# Patient Record
Sex: MALE | Race: WHITE | NOT HISPANIC OR LATINO | Employment: FULL TIME | URBAN - METROPOLITAN AREA
[De-identification: names, ages, dates, MRNs, and addresses within clinical notes are randomized per-mention and may not be internally consistent; named-entity substitution may affect disease eponyms.]

---

## 2017-05-26 ENCOUNTER — OFFICE VISIT (OUTPATIENT)
Dept: URGENT CARE | Facility: CLINIC | Age: 27
End: 2017-05-26
Payer: COMMERCIAL

## 2017-05-26 VITALS
RESPIRATION RATE: 16 BRPM | TEMPERATURE: 98.4 F | HEART RATE: 62 BPM | SYSTOLIC BLOOD PRESSURE: 120 MMHG | WEIGHT: 200 LBS | DIASTOLIC BLOOD PRESSURE: 72 MMHG | BODY MASS INDEX: 27.12 KG/M2 | OXYGEN SATURATION: 98 %

## 2017-05-26 DIAGNOSIS — J02.9 PHARYNGITIS, UNSPECIFIED ETIOLOGY: ICD-10-CM

## 2017-05-26 PROCEDURE — 99203 OFFICE O/P NEW LOW 30 MIN: CPT | Performed by: NURSE PRACTITIONER

## 2017-05-26 RX ORDER — AMOXICILLIN 875 MG/1
875 TABLET, COATED ORAL 2 TIMES DAILY
Qty: 20 TAB | Refills: 0 | Status: SHIPPED | OUTPATIENT
Start: 2017-05-26 | End: 2017-06-05

## 2017-05-26 ASSESSMENT — ENCOUNTER SYMPTOMS
MYALGIAS: 1
SWOLLEN GLANDS: 1
CARDIOVASCULAR NEGATIVE: 1
COUGH: 0
EYES NEGATIVE: 1
CHILLS: 1
GASTROINTESTINAL NEGATIVE: 1
HEADACHES: 0
VOMITING: 0
FEVER: 1
TROUBLE SWALLOWING: 1
SORE THROAT: 1
SHORTNESS OF BREATH: 0

## 2017-05-26 NOTE — MR AVS SNAPSHOT
Abdelrahman Bar   2017 10:30 AM   Office Visit   MRN: [id number]    Department:  [facility]   Dept Phone:  [phone]    Description:  Male : 1990   Provider:  Cathey J Hamman, A.P.N.           Reason for Visit     Pharyngitis x1 day body aches      Allergies as of 2017     No Known Allergies      You were diagnosed with     Pharyngitis, unspecified etiology   [[id number]]         Vital Signs     Blood Pressure Pulse Temperature Respirations Weight Oxygen Saturation    120/72 mmHg 62 36.9 °C (98.4 °F) 16 90.719 kg (200 lb) 98%      Basic Information     Date Of Birth Sex Race Ethnicity Preferred Language    1990 Male White Non- English      Health Maintenance        Date Due Completion Dates    IMM DTaP/Tdap/Td Vaccine (1 - Tdap) 2007 ---            Current Immunizations     No immunizations on file.      Below and/or attached are the medications your provider expects you to take. Review all of your home medications and newly ordered medications with your provider and/or pharmacist. Follow medication instructions as directed by your provider and/or pharmacist. Please keep your medication list with you and share with your provider. Update the information when medications are discontinued, doses are changed, or new medications (including over-the-counter products) are added; and carry medication information at all times in the event of emergency situations     Allergies:  No Known Allergies          Medications  Valid as of: May 26, 2017 - 10:47 AM    Generic Name Brand Name Tablet Size Instructions for use    Amoxicillin (Tab) AMOXIL 875 MG Take 1 Tab by mouth 2 times a day for 10 days.        Ibuprofen (Tab) MOTRIN 200 MG as needed         .                 Medicines prescribed today were sent to:     [facility] #[id number] - [place] - [address]    [address] [place] [address]    Phone: [phone] Fax: [phone]    Open 24 Hours?: No      Medication  refill instructions:       If your prescription bottle indicates you have medication refills left, it is not necessary to call your provider’s office. Please contact your pharmacy and they will refill your medication.    If your prescription bottle indicates you do not have any refills left, you may request refills at any time through one of the following ways: The online [employer] system (except Urgent Care), by calling your provider’s office, or by asking your pharmacy to contact your provider’s office with a refill request. Medication refills are processed only during regular business hours and may not be available until the next business day. Your provider may request additional information or to have a follow-up visit with you prior to refilling your medication.   *Please Note: Medication refills are assigned a new Rx number when refilled electronically. Your pharmacy may indicate that no refills were authorized even though a new prescription for the same medication is available at the pharmacy. Please request the medicine by name with the pharmacy before contacting your provider for a refill.           [employer] Access Code: [email or web address]  Expires: 6/25/2017 10:16 AM    Your email address is not on file at [employer].  Email Addresses are required for you to sign up for [employer], please contact [phone] to verify your personal information and to provide your email address prior to attempting to register for [employer].    Abdelrahman Bar  [address] [place] [address]    [employer]  A secure, online tool to manage your health information     [employer]’s [employer]® is a secure, online tool that connects you to your personalized health information from the privacy of your home -- day or night - making it very easy for you to manage your healthcare. Once the activation process is completed, you can even access your medical information using the [employer] dandre, which is available for free in the Apple Dandre  store or Google Play store.     To learn more about [employer], visit [email or web address]    There are two levels of access available (as shown below):   My Chart Features  [facility]  Specialists [facility]  Urgent  Care Non-[facility] Primary Care Doctor   Email your healthcare team securely and privately 24/7 X X X    Manage appointments: schedule your next appointment; view details of past/upcoming appointments X      Request prescription refills. X      View recent personal medical records, including lab and immunizations X X X X   View health record, including health history, allergies, medications X X X X   Read reports about your outpatient visits, procedures, consult and ER notes X X X X   See your discharge summary, which is a recap of your hospital and/or ER visit that includes your diagnosis, lab results, and care plan X X  X     How to register for [employer]:  Once your e-mail address has been verified, follow the following steps to sign up for [employer].     1. Go to  [email or web address]  2. Click on the Sign Up Now box, which takes you to the New Member Sign Up page. You will need to provide the following information:  a. Enter your [employer] Access Code exactly as it appears at the top of this page. (You will not need to use this code after you’ve completed the sign-up process. If you do not sign up before the expiration date, you must request a new code.)   b. Enter your date of birth.   c. Enter your home email address.   d. Click Submit, and follow the next screen’s instructions.  3. Create a [employer] ID. This will be your [employer] login ID and cannot be changed, so think of one that is secure and easy to remember.  4. Create a [employer] password. You can change your password at any time.  5. Enter your Password Reset Question and Answer. This can be used at a later time if you forget your password.   6. Enter your e-mail address. This allows you to receive e-mail notifications when  new information is available in [employer].  7. Click Sign Up. You can now view your health information.    For assistance activating your [employer] account, call [phone]

## 2017-05-26 NOTE — PROGRESS NOTES
Subjective:      Abdelrahman Bar is a 26 y.o. male who presents with No chief complaint on file.    Past Medical History   Diagnosis Date   • Asthma      AS CHILD     Social History     Social History   • Marital Status:      Spouse Name: N/A   • Number of Children: N/A   • Years of Education: N/A     Occupational History   • Not on file.     Social History Main Topics   • Smoking status: Not on file   • Smokeless tobacco: Not on file   • Alcohol Use: Not on file   • Drug Use: Not on file   • Sexual Activity: Not on file     Other Topics Concern   • Not on file     Social History Narrative   • No narrative on file     Family History   Problem Relation Age of Onset   • Cancer Maternal Grandfather      Allergies: Review of patient's allergies indicates no known allergies.    This patient is a 26-year-old male who presents today with complaint of severely sore throat, onset yesterday. He's had body aches and chills with this. He denies any other upper respiratory symptoms or nasal congestion or drainage. He denies cough.        Pharyngitis   This is a new problem. The current episode started yesterday. The problem has been gradually worsening. Neither side of throat is experiencing more pain than the other. Maximum temperature: patient reports tactile fever. The fever has been present for less than 1 day. The pain is moderate. Associated symptoms include swollen glands and trouble swallowing. Pertinent negatives include no congestion, coughing, ear pain, headaches, shortness of breath or vomiting. He has tried nothing for the symptoms. The treatment provided no relief.       Review of Systems   Constitutional: Positive for fever, chills and malaise/fatigue.   HENT: Positive for sore throat and trouble swallowing. Negative for congestion and ear pain.    Eyes: Negative.    Respiratory: Negative for cough and shortness of breath.    Cardiovascular: Negative.    Gastrointestinal: Negative.  Negative for vomiting.    Genitourinary: Negative.    Musculoskeletal: Positive for myalgias.   Skin: Negative.    Neurological: Negative for headaches.       All other systems reviewed and are negative      Objective:     There were no vitals taken for this visit.     Physical Exam   Constitutional: He is oriented to person, place, and time. He appears well-developed and well-nourished. No distress.   HENT:   Head: Normocephalic.   Right Ear: External ear normal.   Left Ear: External ear normal.   Nose: Nose normal.   Mouth/Throat: No oropharyngeal exudate.   Oropharynx is bright red   Eyes: Conjunctivae and EOM are normal. Pupils are equal, round, and reactive to light. Right eye exhibits no discharge. Left eye exhibits no discharge.   Neck: Normal range of motion. Neck supple.   Cardiovascular: Normal rate and regular rhythm.    Pulmonary/Chest: Effort normal and breath sounds normal.   Musculoskeletal: Normal range of motion.   Lymphadenopathy:     He has no cervical adenopathy.   Neurological: He is alert and oriented to person, place, and time.   Skin: Skin is warm and dry. He is not diaphoretic.   Psychiatric: He has a normal mood and affect. His behavior is normal.   Vitals reviewed.              Assessment/Plan:   Pharyngitis  Malaise   -amoxil   -warm saltwater gargles PRN   -chloraseptic spray/lozenges PRN   -follow up if symptoms persist or worsen  There are no diagnoses linked to this encounter.

## 2018-09-10 ENCOUNTER — HOSPITAL ENCOUNTER (OUTPATIENT)
Facility: MEDICAL CENTER | Age: 28
End: 2018-09-10
Attending: PHYSICIAN ASSISTANT

## 2018-09-10 ENCOUNTER — OFFICE VISIT (OUTPATIENT)
Dept: URGENT CARE | Facility: CLINIC | Age: 28
End: 2018-09-10

## 2018-09-10 VITALS
WEIGHT: 187.6 LBS | RESPIRATION RATE: 16 BRPM | TEMPERATURE: 98.2 F | OXYGEN SATURATION: 98 % | DIASTOLIC BLOOD PRESSURE: 82 MMHG | BODY MASS INDEX: 25.41 KG/M2 | HEIGHT: 72 IN | SYSTOLIC BLOOD PRESSURE: 114 MMHG | HEART RATE: 52 BPM

## 2018-09-10 DIAGNOSIS — Z11.3 SCREENING FOR STD (SEXUALLY TRANSMITTED DISEASE): ICD-10-CM

## 2018-09-10 PROCEDURE — 87491 CHLMYD TRACH DNA AMP PROBE: CPT

## 2018-09-10 PROCEDURE — 99212 OFFICE O/P EST SF 10 MIN: CPT | Performed by: PHYSICIAN ASSISTANT

## 2018-09-10 PROCEDURE — 87591 N.GONORRHOEAE DNA AMP PROB: CPT

## 2018-09-10 PROCEDURE — 99000 SPECIMEN HANDLING OFFICE-LAB: CPT | Performed by: PHYSICIAN ASSISTANT

## 2018-09-11 ENCOUNTER — HOSPITAL ENCOUNTER (OUTPATIENT)
Dept: LAB | Facility: MEDICAL CENTER | Age: 28
End: 2018-09-11
Attending: PHYSICIAN ASSISTANT

## 2018-09-11 DIAGNOSIS — Z11.3 SCREENING FOR STD (SEXUALLY TRANSMITTED DISEASE): ICD-10-CM

## 2018-09-11 LAB
HAV IGM SERPL QL IA: NEGATIVE
HBV CORE IGM SER QL: NEGATIVE
HBV SURFACE AG SER QL: NEGATIVE
HCV AB SER QL: NEGATIVE
HIV 1+2 AB+HIV1 P24 AG SERPL QL IA: NON REACTIVE
TREPONEMA PALLIDUM IGG+IGM AB [PRESENCE] IN SERUM OR PLASMA BY IMMUNOASSAY: NON REACTIVE

## 2018-09-11 PROCEDURE — 80074 ACUTE HEPATITIS PANEL: CPT

## 2018-09-11 PROCEDURE — 86780 TREPONEMA PALLIDUM: CPT

## 2018-09-11 PROCEDURE — 86694 HERPES SIMPLEX NES ANTBDY: CPT

## 2018-09-11 PROCEDURE — 36415 COLL VENOUS BLD VENIPUNCTURE: CPT

## 2018-09-11 PROCEDURE — 87389 HIV-1 AG W/HIV-1&-2 AB AG IA: CPT

## 2018-09-11 NOTE — PROGRESS NOTES
"Subjective:      Abdelrahman Bar is a 28 y.o. male who presents with Sexually Transmitted Diseases (std check)            Patient is a 28-year-old male who presents today wanting to be tested for STDs at this time.  Patient currently denies any new sexual partners, any penile discharge, urinary symptoms or recent exposure to STD that he is aware of.  Patient readily admits that he has not been tested for STDs in the past and is wanting \"everything \"tested.      Other   This is a new problem. The current episode started more than 1 year ago. Pertinent negatives include no abdominal pain, change in bowel habit, chills, congestion, fever, myalgias, neck pain, rash, urinary symptoms, visual change or vomiting. Nothing aggravates the symptoms. He has tried nothing for the symptoms.       Review of Systems   Constitutional: Negative for chills and fever.   HENT: Negative for congestion.    Gastrointestinal: Negative for abdominal pain, change in bowel habit, diarrhea and vomiting.   Genitourinary: Negative for dysuria, flank pain, frequency, hematuria and urgency.   Musculoskeletal: Negative for myalgias and neck pain.   Skin: Negative for itching and rash.   All other systems reviewed and are negative.         Objective:     /82   Pulse (!) 52   Temp 36.8 °C (98.2 °F)   Resp 16   Ht 1.829 m (6' 0.01\")   Wt 85.1 kg (187 lb 9.6 oz)   SpO2 98%   BMI 25.44 kg/m²    PMH:  has a past medical history of ASTHMA.  MEDS:   Current Outpatient Prescriptions:   •  IBUPROFEN 200 MG PO TABS, as needed , Disp: , Rfl:   ALLERGIES: No Known Allergies  SURGHX:   Past Surgical History:   Procedure Laterality Date   • MASS EXCISION ORTHO  10/7/08    Performed by [facility] at [facility]   • DENTAL EXTRACTION(S)  2/2007    wisdom teeth     SOCHX:  reports that he has never smoked. He has never used smokeless tobacco.  FH: Family history was reviewed, no pertinent findings to report    Physical Exam "   Constitutional: He is oriented to person, place, and time. He appears well-developed and well-nourished. No distress.   HENT:   Head: Normocephalic and atraumatic.   Eyes: Pupils are equal, round, and reactive to light. Conjunctivae and EOM are normal.   Neck: Normal range of motion. Neck supple. No tracheal deviation present.   Cardiovascular: Normal rate.    Pulmonary/Chest: Effort normal.   Neurological: He is alert and oriented to person, place, and time. Coordination normal.   Skin: Skin is warm. No rash noted.   Psychiatric: He has a normal mood and affect. His behavior is normal. Judgment and thought content normal.   Vitals reviewed.              Assessment/Plan:     1. Screening for STD (sexually transmitted disease)  - HIV AG/AB COMBO ASSAY SCREENING; Future  - RPR (SYPHILIS); Future  - HEPATITIS PANEL ACUTE(4 COMPONENTS); Future  - HSV I/II IGG & IGM SERUM; Future  - CHLAMYDIA/GC PCR URINE OR SWAB; Future     Will send off for blood work and cultures at this time patient currently without symptoms however I will treat if-deemed necessary.  Pt. Understands the plan and agrees.   Also discussed if indeed positive certain positive results would indicate alerting the health department-patient understands.

## 2018-09-12 LAB
C TRACH DNA SPEC QL NAA+PROBE: NEGATIVE
N GONORRHOEA DNA SPEC QL NAA+PROBE: NEGATIVE
SPECIMEN SOURCE: NORMAL

## 2018-09-12 ASSESSMENT — ENCOUNTER SYMPTOMS
CHILLS: 0
NECK PAIN: 0
VOMITING: 0
DIARRHEA: 0
ABDOMINAL PAIN: 0
MYALGIAS: 0
FEVER: 0
VISUAL CHANGE: 0
CHANGE IN BOWEL HABIT: 0
FLANK PAIN: 0

## 2018-09-13 LAB
HSV1+2 IGG SER IA-ACNC: 0.38 IV
HSV1+2 IGM SER IA-ACNC: 0.62 IV

## 2018-09-15 ENCOUNTER — TELEPHONE (OUTPATIENT)
Dept: URGENT CARE | Facility: CLINIC | Age: 28
End: 2018-09-15

## 2018-09-15 NOTE — TELEPHONE ENCOUNTER
----- Message from Shaw Alexis P.A.-C. sent at 9/15/2018 10:04 AM PDT -----  Please alert this patient that all his labs were negative. HIV, hepatitis, herpes, syphilis, gonorrhea and chlamydia.  Thanks!  Shaw

## 2018-09-15 NOTE — TELEPHONE ENCOUNTER
Called pt didn't answer left voice message to call back. He ended up just coming into the [facility] and we were able to give him your message. Thanks

## 2019-04-05 ENCOUNTER — OFFICE VISIT (OUTPATIENT)
Dept: URGENT CARE | Facility: CLINIC | Age: 29
End: 2019-04-05
Payer: COMMERCIAL

## 2019-04-05 VITALS
WEIGHT: 197.4 LBS | OXYGEN SATURATION: 98 % | BODY MASS INDEX: 27.64 KG/M2 | TEMPERATURE: 98.2 F | DIASTOLIC BLOOD PRESSURE: 64 MMHG | HEART RATE: 62 BPM | HEIGHT: 71 IN | SYSTOLIC BLOOD PRESSURE: 112 MMHG | RESPIRATION RATE: 12 BRPM

## 2019-04-05 DIAGNOSIS — Z02.5 SPORTS PHYSICAL: ICD-10-CM

## 2019-04-05 DIAGNOSIS — B02.9 HERPES ZOSTER WITHOUT COMPLICATION: ICD-10-CM

## 2019-04-05 PROCEDURE — 7101 PR PHYSICAL: Performed by: NURSE PRACTITIONER

## 2019-04-05 PROCEDURE — 99213 OFFICE O/P EST LOW 20 MIN: CPT | Performed by: NURSE PRACTITIONER

## 2019-04-05 RX ORDER — VALACYCLOVIR HYDROCHLORIDE 1 G/1
1000 TABLET, FILM COATED ORAL 3 TIMES DAILY
Qty: 21 TAB | Refills: 2 | Status: SHIPPED | OUTPATIENT
Start: 2019-04-05 | End: 2019-04-12

## 2019-04-05 ASSESSMENT — PAIN SCALES - GENERAL: PAINLEVEL: NO PAIN

## 2019-04-05 ASSESSMENT — ENCOUNTER SYMPTOMS
CHILLS: 0
FEVER: 0

## 2019-04-05 NOTE — PROGRESS NOTES
"Subjective:      Abdelrahman Bar is a 28 y.o. male who presents with Rash (R chest, wraps around side to back, itchy)    Past Medical History:   Diagnosis Date   • ASTHMA     AS CHILD     Social History     Social History   • Marital status:      Spouse name: N/A   • Number of children: N/A   • Years of education: N/A     Occupational History   • Not on file.     Social History Main Topics   • Smoking status: Never Smoker   • Smokeless tobacco: Never Used   • Alcohol use 6.0 oz/week     10 Cans of beer per week   • Drug use: No   • Sexual activity: Yes     Partners: Female     Other Topics Concern   • Not on file     Social History Narrative   • No narrative on file     Family History   Problem Relation Age of Onset   • Cancer Maternal Grandfather        Allergies: Patient has no known allergies.    Patient presents for 2 requests.  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  Second, patient also has a painful rash to the right anterior chest wrapping around to the right upper back.           Rash    This is a new problem. The current episode started in the past 7 days. The problem is unchanged. The rash is characterized by blistering, pain and redness. He was exposed to nothing. Pertinent negatives include no fever. The treatment provided no relief.       Review of Systems   Constitutional: Negative for chills and fever.   Skin: Positive for rash.   All other systems reviewed and are negative.         Objective:     /64 (BP Location: Left arm, Patient Position: Sitting, BP Cuff Size: Adult)   Pulse 62   Temp 36.8 °C (98.2 °F) (Temporal)   Resp 12   Ht 1.803 m (5' 11\")   Wt 89.5 kg (197 lb 6.4 oz)   SpO2 98%   BMI 27.53 kg/m²       Physical Exam   Skin:        Several areas of red raised erythematous patches with vesicular lesions.     Physical exam otherwise within normal limits, please see form scanned into the patient's chart.          Assessment/Plan:     1. " Sports physical  See form scanned into patient's chart    2. Herpes zoster without complication    - valacyclovir (VALTREX) 1 GM Tab; Take 1 Tab by mouth 3 times a day for 7 days.  Dispense: 21 Tab; Refill: 2  -follow up otherwise for persistent symptoms

## 2021-05-20 ENCOUNTER — HOSPITAL ENCOUNTER (EMERGENCY)
Dept: HOSPITAL 8 - ED | Age: 31
Discharge: HOME | End: 2021-05-20
Payer: MEDICAID

## 2021-05-20 VITALS — HEIGHT: 70 IN | BODY MASS INDEX: 22.09 KG/M2 | WEIGHT: 154.32 LBS

## 2021-05-20 VITALS — DIASTOLIC BLOOD PRESSURE: 64 MMHG | SYSTOLIC BLOOD PRESSURE: 127 MMHG

## 2021-05-20 DIAGNOSIS — Z59.0: ICD-10-CM
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
## 2021-05-20 DIAGNOSIS — Z72.9: ICD-10-CM

## 2021-05-20 DIAGNOSIS — S90.812A: Primary | ICD-10-CM

## 2021-05-20 DIAGNOSIS — S90.811A: ICD-10-CM

## 2021-05-20 PROCEDURE — 99283 EMERGENCY DEPT VISIT LOW MDM: CPT

## 2021-05-20 SDOH — ECONOMIC STABILITY - HOUSING INSECURITY: HOMELESSNESS: Z59.0

## 2021-05-20 NOTE — NUR
SEE TRIAGE NOTE.  BP CUFF, PULSE OX IN PLACE.  WARM BLANKET PROVIDED.  PT 
WITHOUT COMPLAINT AT THIS TIME EXCEPT BEING COLD.  SWELLING NOTED TO R FOOT.